# Patient Record
Sex: MALE
[De-identification: names, ages, dates, MRNs, and addresses within clinical notes are randomized per-mention and may not be internally consistent; named-entity substitution may affect disease eponyms.]

---

## 2017-04-04 PROBLEM — Z00.00 ENCOUNTER FOR PREVENTIVE HEALTH EXAMINATION: Status: ACTIVE | Noted: 2017-04-04

## 2017-04-05 ENCOUNTER — APPOINTMENT (OUTPATIENT)
Dept: PULMONOLOGY | Facility: CLINIC | Age: 62
End: 2017-04-05

## 2017-04-05 VITALS
DIASTOLIC BLOOD PRESSURE: 78 MMHG | SYSTOLIC BLOOD PRESSURE: 122 MMHG | BODY MASS INDEX: 30.42 KG/M2 | OXYGEN SATURATION: 96 % | TEMPERATURE: 98.3 F | HEART RATE: 85 BPM | WEIGHT: 203.04 LBS | HEIGHT: 68.5 IN

## 2017-04-05 DIAGNOSIS — Z87.891 PERSONAL HISTORY OF NICOTINE DEPENDENCE: ICD-10-CM

## 2017-04-05 DIAGNOSIS — Z83.49 FAMILY HISTORY OF OTHER ENDOCRINE, NUTRITIONAL AND METABOLIC DISEASES: ICD-10-CM

## 2017-04-05 DIAGNOSIS — Z83.3 FAMILY HISTORY OF DIABETES MELLITUS: ICD-10-CM

## 2017-04-05 DIAGNOSIS — Z82.5 FAMILY HISTORY OF ASTHMA AND OTHER CHRONIC LOWER RESPIRATORY DISEASES: ICD-10-CM

## 2017-04-05 DIAGNOSIS — Z78.9 OTHER SPECIFIED HEALTH STATUS: ICD-10-CM

## 2017-04-05 DIAGNOSIS — Z82.3 FAMILY HISTORY OF STROKE: ICD-10-CM

## 2017-04-05 DIAGNOSIS — F15.90 OTHER STIMULANT USE, UNSPECIFIED, UNCOMPLICATED: ICD-10-CM

## 2017-04-05 DIAGNOSIS — Z86.39 PERSONAL HISTORY OF OTHER ENDOCRINE, NUTRITIONAL AND METABOLIC DISEASE: ICD-10-CM

## 2017-04-05 DIAGNOSIS — Z82.49 FAMILY HISTORY OF ISCHEMIC HEART DISEASE AND OTHER DISEASES OF THE CIRCULATORY SYSTEM: ICD-10-CM

## 2017-04-05 RX ORDER — VENLAFAXINE HYDROCHLORIDE 225 MG/1
225 TABLET, EXTENDED RELEASE ORAL
Refills: 0 | Status: ACTIVE | COMMUNITY

## 2017-04-05 RX ORDER — PROPRANOLOL HYDROCHLORIDE 120 MG/1
120 CAPSULE, EXTENDED RELEASE ORAL
Refills: 0 | Status: ACTIVE | COMMUNITY

## 2017-04-05 RX ORDER — LEVOTHYROXINE SODIUM 0.11 MG/1
112 TABLET ORAL
Refills: 0 | Status: ACTIVE | COMMUNITY

## 2017-04-05 RX ORDER — CLONAZEPAM 0.5 MG/1
0.5 TABLET ORAL
Refills: 0 | Status: ACTIVE | COMMUNITY

## 2017-04-05 RX ORDER — ARIPIPRAZOLE 10 MG/1
10 TABLET ORAL
Refills: 0 | Status: ACTIVE | COMMUNITY

## 2017-04-05 RX ORDER — DESIPRAMINE 150 MG/1
150 TABLET, FILM COATED ORAL
Refills: 0 | Status: ACTIVE | COMMUNITY

## 2017-04-05 RX ORDER — LAMOTRIGINE 150 MG/1
150 TABLET ORAL
Refills: 0 | Status: ACTIVE | COMMUNITY

## 2018-07-28 PROBLEM — Z78.9 ALCOHOL USE: Status: INACTIVE | Noted: 2017-04-05

## 2018-08-23 ENCOUNTER — APPOINTMENT (OUTPATIENT)
Dept: PULMONOLOGY | Facility: CLINIC | Age: 63
End: 2018-08-23
Payer: COMMERCIAL

## 2018-08-23 ENCOUNTER — TRANSCRIPTION ENCOUNTER (OUTPATIENT)
Age: 63
End: 2018-08-23

## 2018-08-23 VITALS
WEIGHT: 203.25 LBS | HEIGHT: 68.5 IN | TEMPERATURE: 97.6 F | OXYGEN SATURATION: 100 % | SYSTOLIC BLOOD PRESSURE: 166 MMHG | HEART RATE: 67 BPM | BODY MASS INDEX: 30.45 KG/M2 | DIASTOLIC BLOOD PRESSURE: 84 MMHG

## 2018-08-23 DIAGNOSIS — I10 ESSENTIAL (PRIMARY) HYPERTENSION: ICD-10-CM

## 2018-08-23 PROCEDURE — 99213 OFFICE O/P EST LOW 20 MIN: CPT

## 2019-02-25 ENCOUNTER — APPOINTMENT (OUTPATIENT)
Dept: PULMONOLOGY | Facility: CLINIC | Age: 64
End: 2019-02-25
Payer: COMMERCIAL

## 2019-02-25 VITALS
RESPIRATION RATE: 12 BRPM | BODY MASS INDEX: 28.91 KG/M2 | OXYGEN SATURATION: 96 % | WEIGHT: 193 LBS | SYSTOLIC BLOOD PRESSURE: 120 MMHG | HEIGHT: 68.5 IN | DIASTOLIC BLOOD PRESSURE: 80 MMHG | TEMPERATURE: 97.3 F | HEART RATE: 68 BPM

## 2019-02-25 PROCEDURE — 99214 OFFICE O/P EST MOD 30 MIN: CPT

## 2019-02-25 NOTE — ASSESSMENT
[FreeTextEntry1] : He appears to be doing well with very good compliance on CPAP, although I do not have a compliance report from his home care company. His home care company appears to have been doing a poor job giving him supplies recently and his mask is quite old. I will order him supplies from a different home care company.\par \par He does have significant daytime sleepiness. This is despite apparently excellent CPAP treatment. I think this may be related to his psychiatric medications, although there may be an element of residual sleepiness untreated sleep apnea. I discussed with him the possibility of using modafinil, but I would not be comfortable doing this without speaking to his psychiatrist and discuss interactions with his other medications.  I'll be happy to do this if he wishes. For now I have made no changes.

## 2019-02-25 NOTE — HISTORY OF PRESENT ILLNESS
[FreeTextEntry1] : 4/[birth year]/17: 61 yr old M with hx bipolar disorder treated for obstructive sleep apnea with CPAP.  Last seen by me about 2 yrs ago at [place].  Compliant w CPAP, no recent supplies, uses std nasal mask.  Airsense 10 machine, auto. Remains sleepy, [place] sleepiness scale (out of 24 points) = 17.  Usual sleep history is: bedtime 830    pm, sleep latency  5   min,  1   awakenings, out of bed at 4   am.\par \par 8/23/18: continues to do well w CPAP- buying travel machine, compliance 100%.  Do not have download from durable medical equipment provider ([employer]).  Sleeping well, no significant excessive daytime somnolence. No change in weight.\par \par 2/25/19:  He appears to be doing well with CPAP. There is no significant interval medical history. He is here today with his  who reports that he does have significant daytime sleepiness. He easily falls asleep if they go to a concert or theater. His psychiatric medications are unchanged. His compliance is 100% with CPAP, he sleeping at least 7 hours, often naps during the day. He continues to use CPAP with a full face mask, durable medical equipment provider is "[employer] he does not appear to have gotten supplies the last several months.\par \par

## 2019-02-25 NOTE — PHYSICAL EXAM
[General Appearance - Well Developed] : well developed [Normal Appearance] : normal appearance [Well Groomed] : well groomed [General Appearance - Well Nourished] : well nourished [No Deformities] : no deformities [General Appearance - In No Acute Distress] : no acute distress [Normal Conjunctiva] : the conjunctiva exhibited no abnormalities [Eyelids - No Xanthelasma] : the eyelids demonstrated no xanthelasmas [Normal Oropharynx] : normal oropharynx [Low Lying Soft Palate] : low lying soft palate [Elongated Uvula] : no elongated uvula [III] : III [FreeTextEntry1] : crowded [Abnormal Walk] : normal gait [Musculoskeletal - Swelling] : no joint swelling seen [Motor Tone] : muscle strength and tone were normal [Nail Clubbing] : no clubbing of the fingernails [Cyanosis, Localized] : no localized cyanosis [Petechial Hemorrhages (___cm)] : no petechial hemorrhages [] : no ischemic changes

## 2020-07-07 ENCOUNTER — TRANSCRIPTION ENCOUNTER (OUTPATIENT)
Age: 65
End: 2020-07-07

## 2020-07-10 ENCOUNTER — APPOINTMENT (OUTPATIENT)
Dept: PULMONOLOGY | Facility: CLINIC | Age: 65
End: 2020-07-10
Payer: COMMERCIAL

## 2020-07-10 DIAGNOSIS — F31.70 BIPOLAR DISORDER, CURRENTLY IN REMISSION, MOST RECENT EPISODE UNSPECIFIED: ICD-10-CM

## 2020-07-10 DIAGNOSIS — G47.33 OBSTRUCTIVE SLEEP APNEA (ADULT) (PEDIATRIC): ICD-10-CM

## 2020-07-10 PROCEDURE — 99214 OFFICE O/P EST MOD 30 MIN: CPT | Mod: 95

## 2020-07-10 NOTE — REASON FOR VISIT
[Medical Office: ([facility])___] : at the medical office located in  [Home] : at home, [unfilled] , at the time of the visit. [Spouse] : spouse [Verbal consent obtained from patient] : the patient, [unfilled]

## 2020-07-10 NOTE — HISTORY OF PRESENT ILLNESS
[FreeTextEntry1] : 4/[birth year]/17: 61 yr old M with hx bipolar disorder treated for obstructive sleep apnea with CPAP.  Last seen by me about 2 yrs ago at [place].  Compliant w CPAP, no recent supplies, uses std nasal mask.  Airsense 10 machine, auto. Remains sleepy, [place] sleepiness scale (out of 24 points) = 17.  Usual sleep history is: bedtime 830    pm, sleep latency  5   min,  1   awakenings, out of bed at 4   am.\par \par 8/23/18: continues to do well w CPAP- buying travel machine, compliance 100%.  Do not have download from durable medical equipment provider ([employer]).  Sleeping well, no significant excessive daytime somnolence. No change in weight.\par \par 2/25/19:  He appears to be doing well with CPAP. There is no significant interval medical history. He is here today with his  who reports that he does have significant daytime sleepiness. He easily falls asleep if they go to a concert or theater. His psychiatric medications are unchanged. His compliance is 100% with CPAP, he sleeping at least 7 hours, often naps during the day. He continues to use CPAP with a full face mask, durable medical equipment provider is [employer], he does not appear to have gotten supplies the last several months.\par \par 7/10/2020: Doing well on CPAP, sleep habits irregular- e.g.sleeps from 830 pm to 230 am, then naps. Has lost weight, rom 220 high to 180 now. Still c/o some excessive daytime somnolence

## 2020-07-10 NOTE — ASSESSMENT
[FreeTextEntry1] : CPAP compliance/efficacy downloaded today.  Machine:Auto S-10   . Set at:  8-20      Apnea hypopnea index on treatment:   3.8  .  Use > 4h per night: 100% past 30 d, mild leak\par \par Discussed rx of excessive daytime somnolence- not good candidate for modafinil because of psychiatric disease.  Some of excessive daytime somnolence likely from meds. \par \par Given long term advice about CPAP use.  Call durable medical equipment provider provider if any equipment problems.  Replace interface as per schedule, generally at least every 3 months.  Stressed importance of CPAP compliance.  Return to see me in about 12 months if doing well.

## 2021-10-06 PROBLEM — I10 ESSENTIAL HYPERTENSION: Status: ACTIVE | Noted: 2017-04-05
